# Patient Record
Sex: FEMALE | Race: AMERICAN INDIAN OR ALASKA NATIVE
[De-identification: names, ages, dates, MRNs, and addresses within clinical notes are randomized per-mention and may not be internally consistent; named-entity substitution may affect disease eponyms.]

---

## 2018-01-19 NOTE — CAT SCAN REPORT
CT ABDOMEN AND PELVIS WITH CONTRAST: 01/19/18 14:57:00





CLINICAL: Abdominal pain.



COMPARISON: None.





TECHNIQUE:

Volumetric acquisition and 1.25 millimeter scan reconstructions after 

the uneventful intravenous injection of 100 cc Omnipaque 300.  Consent 

was obtained prior to the administration of contrast.  Oral contrast 

was also given. 



FINDINGS:





Abdomen: Lung bases are clear.Normal liver, bile ducts and gallbladder. 

 Normal stomach, duodenum, pancreas and spleen.  Normal adrenal glands 

and kidneys.  The renal collecting systems and ureters are nondilated. 

Normal small bowel.Normal ascending, transverse and descending colon. 

There is a large volume of stool in the right colon and transverse 

colon.  The appendix is long with a loop extending cephalad near the 

gallbladder in the right upper quadrant.  The appendix is normal size 

and there is no periappendiceal inflammatory change.  No mass, 

lymphadenopathy or ascites.No pneumoperitoneum.



Pelvis: Absence of uterus and normal vaginal cuff.A normal small left 

ovary with a 1 cm follicle.  The right ovary is not identified.  No 

adnexal mass or free fluid. Normal rectum and sigmoid colon.The pelvic 

veins and imaged portions of lower extremity veins are normal.



Bone windows demonstrate no bone lesion.



IMPRESSION: 1.  Normal abdomen.  2.  Normal pelvis status post 

hysterectomy and right salpingo-oophorectomy.  3.  An unusually long 

appendix which extends cephalad to the gallbladder but no signs of 

acute appendicitis.

## 2019-10-23 ENCOUNTER — HOSPITAL ENCOUNTER (OUTPATIENT)
Dept: HOSPITAL 5 - SPVWC | Age: 39
Discharge: HOME | End: 2019-10-23
Attending: INTERNAL MEDICINE
Payer: COMMERCIAL

## 2019-10-23 DIAGNOSIS — Z12.31: Primary | ICD-10-CM

## 2019-10-23 PROCEDURE — 77067 SCR MAMMO BI INCL CAD: CPT

## 2019-10-23 NOTE — MAMMOGRAPHY REPORT
DIGITAL SCREENING MAMMOGRAM WITH CAD, 10/23/2019



INDICATION: Routine screening mammography. 



TECHNIQUE:  Digital bilateral  2D mammography was obtained in the craniocaudal and mediolateral obliq
ue projections. This examination was interpreted with the benefit of Computer-Aided Detection analysi
s.



COMPARISON: 10/12/2016



FINDINGS: 



Breast Density: The breasts are heterogeneously dense, which may obscure small masses.



Left asymmetries on both views require additional imaging. Architectural distortion or suspicious tayler
cifications. There is no evidence of dominant mass, suspicious calcifications or architectural distor
tion in the right breast.



IMPRESSION: Left asymmetries requiring additional imaging. Recommend recall for left spot compression
 views and left breast ultrasound if needed.



Follow up recommendation: Special View: Spot



Category 0: Incomplete. Needs additional imaging evaluation and/or prior mammograms for comparison.



A "normal" or negative report should not discourage follow up or biopsy of a clinically significant f
inding.



A written summary of these findings will be mailed to the patient. The patient will be entered into a
 mammography reporting system which will generate a reminder letter for the patient's next appointmen
t at the appropriate interval.



The [employer] recommends yearly mammograms starting at age 40 and continuing as l
gaby as a woman is in good health.  Breast MRI is recommended for women with an approximate 20-25% or 
greater lifetime risk of breast cancer, including women with a strong family history of breast or ova
nicolasa cancer or who have been treated for Hodgkin's disease.



Signer Name: Kwadwo Lopez MD 

Signed: 10/23/2019 12:33 PM

 Workstation Name: [id number]

## 2019-11-04 ENCOUNTER — HOSPITAL ENCOUNTER (OUTPATIENT)
Dept: HOSPITAL 5 - SPVWC | Age: 39
Discharge: HOME | End: 2019-11-04
Attending: INTERNAL MEDICINE
Payer: COMMERCIAL

## 2019-11-04 DIAGNOSIS — R92.2: Primary | ICD-10-CM

## 2019-11-04 NOTE — MAMMOGRAPHY REPORT
DIGITAL DIAGNOSTIC MAMMOGRAM WITH CAD, 11/4/2019



INDICATION: Recall to evaluate asymmetries. [reason for study]



TECHNIQUE:  Digital left mammographic imaging was performed. Spot compression views were obtained.

This examination was interpreted with the benefit of Computer-aided Detection analysis. 



COMPARISON: 10/23/2019



FINDINGS: 



Breast Density: The breasts are heterogeneously dense, which may obscure small masses.



Additional left mammographic views were performed and demonstrate no persistent asymmetries. Satisfac
tory effacement on spot compression views. 



IMPRESSION: No mammographic evidence of malignancy.



Follow up recommendation: Routine yearly



BI-RADS Category 1:  Negative.



A "normal" or negative report should not discourage follow up or biopsy of a clinically significant f
inding.



A written summary of these findings will be mailed to the patient. The patient will be entered into a
 mammography reporting system which will generate a reminder letter for the patient's next appointmen
t at the appropriate interval.



According to the [employer], yearly mammograms are recommended starting at age 40 
and continuing as long as a woman is in good health.  Breast MRI is recommended for women with an kiara
roximately 20-25% or greater lifetime risk of breast cancer, including women with a strong family his
tory of breast or ovarian cancer and women who have been treated for Hodgkin's disease.



Signer Name: Kwadwo Lopez MD 

Signed: 11/4/2019 1:37 PM

 Workstation Name: [id number]

## 2020-12-28 ENCOUNTER — HOSPITAL ENCOUNTER (OUTPATIENT)
Dept: HOSPITAL 5 - SPVWC | Age: 40
Discharge: HOME | End: 2020-12-28
Attending: INTERNAL MEDICINE
Payer: COMMERCIAL

## 2020-12-28 DIAGNOSIS — Z12.31: Primary | ICD-10-CM

## 2020-12-28 PROCEDURE — 77067 SCR MAMMO BI INCL CAD: CPT

## 2020-12-28 NOTE — MAMMOGRAPHY REPORT
DIGITAL SCREENING MAMMOGRAM WITH CAD, 12/28/2020



CLINICAL INFORMATION / INDICATION: Routine screening mammography. SCREENING MAMMOGRAM



TECHNIQUE:  Digital bilateral 2D mammography was obtained in the craniocaudal and mediolateral obliqu
e projections. This examination was interpreted with the benefit of Computer-Aided Detection analysis
.



COMPARISON: 11/4/2019, 10/12/2016, 12/14/2014



FINDINGS: 



Breast Density: The breasts are heterogeneously dense, which may obscure small masses.



No dominant mass, suspicious calcifications, or architectural distortion in either breast. 





 

IMPRESSION: No mammographic evidence of malignancy.



Follow up recommendation: Routine yearly



BI-RADS Category 1:  Negative.





-------------------------------------------------------------------------------------------

A "normal" or negative report should not discourage follow up or biopsy of a clinically significant f
inding.



A written summary of these findings will be mailed to the patient. The patient will be entered into a
 mammography reporting system which will generate a reminder letter for the patient's next appointmen
t at the appropriate interval.



The [employer] recommends yearly mammograms starting at age 40 and continuing as l
gaby as a woman is in good health.  Breast MRI is recommended for women with an approximate 20-25% or 
greater lifetime risk of breast cancer, including women with a strong family history of breast or ova
nicolasa cancer or who have been treated for Hodgkin's disease.



Signer Name: Angelica Lyn MD 

Signed: 12/28/2020 4:03 PM

Workstation Name: [id number]

## 2022-05-16 ENCOUNTER — HOSPITAL ENCOUNTER (OUTPATIENT)
Dept: HOSPITAL 5 - MAMMO | Age: 42
Discharge: HOME | End: 2022-05-16
Attending: INTERNAL MEDICINE
Payer: COMMERCIAL

## 2022-05-16 DIAGNOSIS — Z12.31: Primary | ICD-10-CM

## 2022-05-16 PROCEDURE — 77067 SCR MAMMO BI INCL CAD: CPT

## 2022-05-17 NOTE — MAMMOGRAPHY REPORT
DIGITAL SCREENING MAMMOGRAM WITH CAD, 5/16/2022



CLINICAL INFORMATION / INDICATION: Routine screening mammography. SCREENING MAMMOGRAM



TECHNIQUE:  Digital bilateral 2D mammography was obtained in the craniocaudal and mediolateral obliqu
e projections. This examination was interpreted with the benefit of Computer-Aided Detection analysis
.



COMPARISON: 10/23/2019, 12/28/2020.



FINDINGS: 



Breast Density: The breasts are heterogeneously dense, which may obscure small masses.



No dominant mass, suspicious calcifications, or architectural distortion in either breast. 



Benign-appearing right-sided nodule unchanged.

 

IMPRESSION: No mammographic evidence of malignancy.



Follow up recommendation: Routine yearly screening mammogram.



BI-RADS Category 1:  NEGATIVE





-------------------------------------------------------------------------------------------

A "normal" or negative report should not discourage follow up or biopsy of a clinically significant f
inding.



A written summary of these findings will be mailed to the patient. The patient will be entered into a
 mammography reporting system which will generate a reminder letter for the patient's next appointmen
t at the appropriate interval.



The [employer] recommends yearly mammograms starting at age 40 and continuing as l
gaby as a woman is in good health.  Breast MRI is recommended for women with an approximate 20-25% or 
greater lifetime risk of breast cancer, including women with a strong family history of breast or ova
nicolasa cancer or who have been treated for Hodgkin's disease.



Signer Name: Kofi Tolbert MD 

Signed: 5/17/2022 2:15 PM

Workstation Name: [id number]